# Patient Record
Sex: FEMALE | Race: WHITE | NOT HISPANIC OR LATINO | Employment: STUDENT | URBAN - METROPOLITAN AREA
[De-identification: names, ages, dates, MRNs, and addresses within clinical notes are randomized per-mention and may not be internally consistent; named-entity substitution may affect disease eponyms.]

---
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
## 2023-07-14 ENCOUNTER — TELEPHONE (OUTPATIENT)
Dept: SCHEDULING | Facility: IMAGING CENTER | Age: 18
End: 2023-07-14

## 2023-07-14 SDOH — HEALTH STABILITY: PHYSICAL HEALTH: ON AVERAGE, HOW MANY MINUTES DO YOU ENGAGE IN EXERCISE AT THIS LEVEL?: 60 MIN

## 2023-07-14 SDOH — ECONOMIC STABILITY: HOUSING INSECURITY: IN THE LAST 12 MONTHS, HOW MANY PLACES HAVE YOU LIVED?: 1

## 2023-07-14 SDOH — ECONOMIC STABILITY: FOOD INSECURITY: WITHIN THE PAST 12 MONTHS, YOU WORRIED THAT YOUR FOOD WOULD RUN OUT BEFORE YOU GOT MONEY TO BUY MORE.: NEVER TRUE

## 2023-07-14 SDOH — ECONOMIC STABILITY: INCOME INSECURITY: IN THE LAST 12 MONTHS, WAS THERE A TIME WHEN YOU WERE NOT ABLE TO PAY THE MORTGAGE OR RENT ON TIME?: NO

## 2023-07-14 SDOH — HEALTH STABILITY: PHYSICAL HEALTH: ON AVERAGE, HOW MANY DAYS PER WEEK DO YOU ENGAGE IN MODERATE TO STRENUOUS EXERCISE (LIKE A BRISK WALK)?: 5 DAYS

## 2023-07-14 SDOH — ECONOMIC STABILITY: TRANSPORTATION INSECURITY
IN THE PAST 12 MONTHS, HAS THE LACK OF TRANSPORTATION KEPT YOU FROM MEDICAL APPOINTMENTS OR FROM GETTING MEDICATIONS?: NO

## 2023-07-14 SDOH — ECONOMIC STABILITY: FOOD INSECURITY: WITHIN THE PAST 12 MONTHS, THE FOOD YOU BOUGHT JUST DIDN'T LAST AND YOU DIDN'T HAVE MONEY TO GET MORE.: NEVER TRUE

## 2023-07-14 SDOH — ECONOMIC STABILITY: HOUSING INSECURITY
IN THE LAST 12 MONTHS, WAS THERE A TIME WHEN YOU DID NOT HAVE A STEADY PLACE TO SLEEP OR SLEPT IN A SHELTER (INCLUDING NOW)?: NO

## 2023-07-14 SDOH — ECONOMIC STABILITY: INCOME INSECURITY: HOW HARD IS IT FOR YOU TO PAY FOR THE VERY BASICS LIKE FOOD, HOUSING, MEDICAL CARE, AND HEATING?: NOT VERY HARD

## 2023-07-14 SDOH — ECONOMIC STABILITY: TRANSPORTATION INSECURITY
IN THE PAST 12 MONTHS, HAS LACK OF TRANSPORTATION KEPT YOU FROM MEETINGS, WORK, OR FROM GETTING THINGS NEEDED FOR DAILY LIVING?: NO

## 2023-07-14 SDOH — ECONOMIC STABILITY: TRANSPORTATION INSECURITY
IN THE PAST 12 MONTHS, HAS LACK OF RELIABLE TRANSPORTATION KEPT YOU FROM MEDICAL APPOINTMENTS, MEETINGS, WORK OR FROM GETTING THINGS NEEDED FOR DAILY LIVING?: NO

## 2023-07-14 SDOH — HEALTH STABILITY: MENTAL HEALTH
STRESS IS WHEN SOMEONE FEELS TENSE, NERVOUS, ANXIOUS, OR CAN'T SLEEP AT NIGHT BECAUSE THEIR MIND IS TROUBLED. HOW STRESSED ARE YOU?: ONLY A LITTLE

## 2023-07-14 ASSESSMENT — SOCIAL DETERMINANTS OF HEALTH (SDOH)
ARE YOU MARRIED, WIDOWED, DIVORCED, SEPARATED, NEVER MARRIED, OR LIVING WITH A PARTNER?: NEVER MARRIED
WITHIN THE PAST 12 MONTHS, YOU WORRIED THAT YOUR FOOD WOULD RUN OUT BEFORE YOU GOT THE MONEY TO BUY MORE: NEVER TRUE
HOW MANY DRINKS CONTAINING ALCOHOL DO YOU HAVE ON A TYPICAL DAY WHEN YOU ARE DRINKING: 1 OR 2
HOW OFTEN DO YOU ATTENT MEETINGS OF THE CLUB OR ORGANIZATION YOU BELONG TO?: NEVER
IN A TYPICAL WEEK, HOW MANY TIMES DO YOU TALK ON THE PHONE WITH FAMILY, FRIENDS, OR NEIGHBORS?: TWICE A WEEK
DO YOU BELONG TO ANY CLUBS OR ORGANIZATIONS SUCH AS CHURCH GROUPS UNIONS, FRATERNAL OR ATHLETIC GROUPS, OR SCHOOL GROUPS?: NO
DO YOU BELONG TO ANY CLUBS OR ORGANIZATIONS SUCH AS CHURCH GROUPS UNIONS, FRATERNAL OR ATHLETIC GROUPS, OR SCHOOL GROUPS?: NO
HOW HARD IS IT FOR YOU TO PAY FOR THE VERY BASICS LIKE FOOD, HOUSING, MEDICAL CARE, AND HEATING?: NOT VERY HARD
HOW OFTEN DO YOU GET TOGETHER WITH FRIENDS OR RELATIVES?: TWICE A WEEK
ARE YOU MARRIED, WIDOWED, DIVORCED, SEPARATED, NEVER MARRIED, OR LIVING WITH A PARTNER?: NEVER MARRIED
HOW OFTEN DO YOU HAVE A DRINK CONTAINING ALCOHOL: MONTHLY OR LESS
HOW OFTEN DO YOU ATTEND CHURCH OR RELIGIOUS SERVICES?: MORE THAN 4 TIMES PER YEAR
IN A TYPICAL WEEK, HOW MANY TIMES DO YOU TALK ON THE PHONE WITH FAMILY, FRIENDS, OR NEIGHBORS?: TWICE A WEEK
HOW OFTEN DO YOU ATTEND CHURCH OR RELIGIOUS SERVICES?: MORE THAN 4 TIMES PER YEAR
HOW OFTEN DO YOU HAVE SIX OR MORE DRINKS ON ONE OCCASION: NEVER
HOW OFTEN DO YOU GET TOGETHER WITH FRIENDS OR RELATIVES?: TWICE A WEEK
HOW OFTEN DO YOU ATTENT MEETINGS OF THE CLUB OR ORGANIZATION YOU BELONG TO?: NEVER

## 2023-07-14 ASSESSMENT — LIFESTYLE VARIABLES
HOW OFTEN DO YOU HAVE SIX OR MORE DRINKS ON ONE OCCASION: NEVER
HOW MANY STANDARD DRINKS CONTAINING ALCOHOL DO YOU HAVE ON A TYPICAL DAY: 1 OR 2
AUDIT-C TOTAL SCORE: 1
SKIP TO QUESTIONS 9-10: 1
HOW OFTEN DO YOU HAVE A DRINK CONTAINING ALCOHOL: MONTHLY OR LESS

## 2023-07-19 ENCOUNTER — OFFICE VISIT (OUTPATIENT)
Dept: MEDICAL GROUP | Facility: MEDICAL CENTER | Age: 18
End: 2023-07-19
Payer: OTHER GOVERNMENT

## 2023-07-19 VITALS
OXYGEN SATURATION: 98 % | WEIGHT: 218 LBS | TEMPERATURE: 99 F | HEART RATE: 78 BPM | BODY MASS INDEX: 35.03 KG/M2 | HEIGHT: 66 IN | RESPIRATION RATE: 18 BRPM | SYSTOLIC BLOOD PRESSURE: 130 MMHG | DIASTOLIC BLOOD PRESSURE: 54 MMHG

## 2023-07-19 DIAGNOSIS — Z00.00 PREVENTATIVE HEALTH CARE: ICD-10-CM

## 2023-07-19 DIAGNOSIS — E70.1 PKU (PHENYLKETONURIA) (HCC): ICD-10-CM

## 2023-07-19 DIAGNOSIS — R01.1 HEART MURMUR: ICD-10-CM

## 2023-07-19 DIAGNOSIS — Z11.59 NEED FOR HEPATITIS C SCREENING TEST: ICD-10-CM

## 2023-07-19 DIAGNOSIS — K21.9 GASTROESOPHAGEAL REFLUX DISEASE WITHOUT ESOPHAGITIS: ICD-10-CM

## 2023-07-19 PROCEDURE — 99213 OFFICE O/P EST LOW 20 MIN: CPT | Performed by: STUDENT IN AN ORGANIZED HEALTH CARE EDUCATION/TRAINING PROGRAM

## 2023-07-19 PROCEDURE — 3078F DIAST BP <80 MM HG: CPT | Performed by: STUDENT IN AN ORGANIZED HEALTH CARE EDUCATION/TRAINING PROGRAM

## 2023-07-19 PROCEDURE — 3075F SYST BP GE 130 - 139MM HG: CPT | Performed by: STUDENT IN AN ORGANIZED HEALTH CARE EDUCATION/TRAINING PROGRAM

## 2023-07-19 ASSESSMENT — PATIENT HEALTH QUESTIONNAIRE - PHQ9: CLINICAL INTERPRETATION OF PHQ2 SCORE: 0

## 2023-07-20 PROBLEM — Z30.019 ENCOUNTER FOR FEMALE BIRTH CONTROL: Status: RESOLVED | Noted: 2021-12-06 | Resolved: 2023-07-20

## 2023-07-20 PROBLEM — L60.0 INGROWN NAIL: Status: RESOLVED | Noted: 2021-11-15 | Resolved: 2023-07-20

## 2023-07-20 PROBLEM — S05.02XA ABRASION OF LEFT CORNEA: Status: RESOLVED | Noted: 2021-11-15 | Resolved: 2023-07-20

## 2023-07-20 ASSESSMENT — ENCOUNTER SYMPTOMS
DIZZINESS: 0
FEVER: 0
ABDOMINAL PAIN: 0
DIARRHEA: 0
HEADACHES: 0
SHORTNESS OF BREATH: 0
NAUSEA: 0
BLURRED VISION: 0
CHILLS: 0
VOMITING: 0
PALPITATIONS: 0

## 2023-07-20 NOTE — PROGRESS NOTES
Subjective:     CC:    Chief Complaint   Patient presents with    Establish Care    Other     Referral for gynecology, endocrinology        HISTORY OF THE PRESENT ILLNESS: Patient is a 18 y.o. female. This pleasant patient is here today to establish care and discuss chronic conditions.  She is requesting referrals.  Prior PCP was jeana in [place].    Problem   Gerd (Gastroesophageal Reflux Disease)    Chronic condition.  Symptoms are intermittent, lasting a few days at a time.  Symptoms are generally well controlled.  Patient avoids trigger foods and takes Tums as needed, and this is typically adequate for symptom relief.     Heart Murmur   PKU (Phenylketonuria) (Hcc)    Chronic condition. Patient was previously managed by her pediatrician, but she has not had regular care since before the COVID-19 pandemic. She has not had developmental, neurologic, or other complications. She generally follows the appropriate diet and takes an amino acid supplement daily.  She is hoping to reestablish with a specialist and dietitian.           Past Medical History: Diagnoses of PKU (phenylketonuria) (HCC), Gastroesophageal reflux disease without esophagitis, Heart murmur, Preventative health care, and Need for hepatitis C screening test were pertinent to this visit.    Current Outpatient Medications   Medication Sig    Amino Acids (PHENYLADE AMINO ACID BLEND PO) Take  by mouth.    CALCIUM PO Take  by mouth.        Social History     Socioeconomic History    Marital status: Single    Highest education level: Some college, no degree   Tobacco Use    Smoking status: Never    Smokeless tobacco: Never   Vaping Use    Vaping Use: Never used   Substance and Sexual Activity    Alcohol use: Not Currently     Comment: 1 drink  per month     Drug use: Never    Sexual activity: Yes     Partners: Male     Birth control/protection: Condom     Social Determinants of Health     Financial Resource Strain: Low Risk  (7/14/2023)    Overall  Financial Resource Strain (CARDIA)     Difficulty of Paying Living Expenses: Not very hard   Food Insecurity: No Food Insecurity (7/14/2023)    Hunger Vital Sign     Worried About Running Out of Food in the Last Year: Never true     Ran Out of Food in the Last Year: Never true   Transportation Needs: No Transportation Needs (7/14/2023)    PRAPARE - Transportation     Lack of Transportation (Medical): No     Lack of Transportation (Non-Medical): No   Physical Activity: Sufficiently Active (7/14/2023)    Exercise Vital Sign     Days of Exercise per Week: 5 days     Minutes of Exercise per Session: 60 min   Stress: No Stress Concern Present (7/14/2023)    [employer] [place] [employer] - Occupational Stress Questionnaire     Feeling of Stress : Only a little   Social Connections: Moderately Isolated (7/14/2023)    Social Connection and Isolation Panel [NHANES]     Frequency of Communication with Friends and Family: Twice a week     Frequency of Social Gatherings with Friends and Family: Twice a week     Attends Mormonism Services: More than 4 times per year     Active Member of Clubs or Organizations: No     Attends Club or Organization Meetings: Never     Marital Status: Never    Housing Stability: Low Risk  (7/14/2023)    Housing Stability Vital Sign     Unable to Pay for Housing in the Last Year: No     Number of Places Lived in the Last Year: 1     Unstable Housing in the Last Year: No       Family History   Problem Relation Age of Onset    Psychiatric Illness Mother         Depression/Anxiety    Psychiatric Illness Maternal Grandmother     Diabetes Maternal Grandfather     Breast Cancer Paternal Grandmother          Health Maintenance: Completed      ROS:   Review of Systems   Constitutional:  Negative for chills and fever.   Eyes:  Negative for blurred vision.   Respiratory:  Negative for shortness of breath.    Cardiovascular:  Negative for chest pain and palpitations.   Gastrointestinal:   "Negative for abdominal pain, diarrhea, nausea and vomiting.   Genitourinary:  Negative for dysuria.   Neurological:  Negative for dizziness and headaches.       Objective:       Exam: /54 (BP Location: Right arm, Patient Position: Sitting, BP Cuff Size: Adult long)   Pulse 78   Temp 37.2 °C (99 °F) (Temporal)   Resp 18   Ht 1.664 m (5' 5.5\")   Wt 98.9 kg (218 lb)   SpO2 98%  Body mass index is 35.73 kg/m².    Physical Exam  Constitutional:       General: She is not in acute distress.  HENT:      Right Ear: External ear normal.      Left Ear: External ear normal.      Mouth/Throat:      Mouth: Mucous membranes are moist.      Pharynx: Oropharynx is clear.   Eyes:      Extraocular Movements: Extraocular movements intact.      Pupils: Pupils are equal, round, and reactive to light.   Cardiovascular:      Rate and Rhythm: Normal rate and regular rhythm.      Heart sounds: Murmur heard.      Systolic murmur is present.      No friction rub. No gallop.   Pulmonary:      Effort: Pulmonary effort is normal.      Breath sounds: No wheezing, rhonchi or rales.   Abdominal:      General: There is no distension.      Palpations: Abdomen is soft.      Tenderness: There is no abdominal tenderness.   Musculoskeletal:         General: No swelling.      Cervical back: Normal range of motion and neck supple.   Lymphadenopathy:      Cervical: No cervical adenopathy.   Skin:     General: Skin is warm and dry.   Neurological:      General: No focal deficit present.      Mental Status: She is alert and oriented to person, place, and time.   Psychiatric:         Mood and Affect: Mood normal.           Labs: No recent labs.    Assessment & Plan:     18 y.o. female with the following -    1. PKU (phenylketonuria) (HCC)  Chronic, managed with diet.  Patient is hoping to get reestablished with a PKU specialist and dietitian.  She has not had regular care for the past 2 to 3 years.  Referrals placed. Will check phenylalanine and " tyrosine levels.  - Phenylalanine and Tyrosine; Future  - Referral to Nutrition Services  - Referral to Endocrinology    2. Gastroesophageal reflux disease without esophagitis  Chronic, controlled.  Continue to avoid trigger foods and Tums as needed for symptoms.  If symptoms become more severe or constant, consider daily H2 blocker or PPI.    3. Heart murmur  Systolic murmur noted on exam today.  Patient notes she has been told she may have a murmur by healthcare providers a few times in the past couple of years.  Denies a childhood murmur.  No associated symptoms. Will get an echo to further evaluate.  - EC-ECHOCARDIOGRAM COMPLETE W/O CONT; Future    4. Preventative health care  - CBC WITH DIFFERENTIAL; Future  - Comp Metabolic Panel; Future  - Lipid Profile; Future    5. Need for hepatitis C screening test  - HEP C VIRUS ANTIBODY; Future      Healthcare Maintenance:  - Patient will look for vaccine records so we can update these in the chart.  - We discussed that she will be due for pap next year.        Return in about 8 weeks (around 9/13/2023).    Please note that this dictation was created using voice recognition software. I have made every reasonable attempt to correct obvious errors, but I expect that there are errors of grammar and possibly content that I did not discover before finalizing the note.

## 2023-10-03 ENCOUNTER — NON-PROVIDER VISIT (OUTPATIENT)
Dept: INTERNAL MEDICINE | Facility: OTHER | Age: 18
End: 2023-10-03
Payer: OTHER GOVERNMENT

## 2023-10-03 VITALS — HEIGHT: 66 IN | BODY MASS INDEX: 34.27 KG/M2 | WEIGHT: 213.2 LBS

## 2023-10-03 NOTE — PROGRESS NOTES
"Sendy Alcala is a 18 y.o. year old, female initial nutrition evaluation for PKU  [employer], psych major    ROS: GERD w/esophagitis    Wellness Vision:  I want to get back to what I was like as a kid, improve my grades, get in shape better, know what macros to eat.    My Health Profile:    PHYSICAL ASSESSMENT  Current Height:  66\"  Ht Readings from Last 1 Encounters:   07/19/23 1.664 m (5' 5.5\")     Current Weight:  216#  Wt Readings from Last 1 Encounters:   07/19/23 98.9 kg (218 lb)     BMI: 34    FLUID INTAKE:  water, sweet tea, coffee  Typical Beverages: no milk, no protein beverages  Servings Alcohol/D/WK/MO: none    ACTIVITY REVIEW: gym on campus  Current Exercise: 4/7- weights, cardio  Hobbies: volleyball on w/e    PERTINENT LABS:   pending    Patient Behaviors (indicate frequency)  Meal/Snack Pattern:     Fast food in college, does not restrict self much  Eats meat  Pasta  salads    Dines away from Home: 2 x/week  Locations:  [employer], [place], [employer], [employer]  Who lives in home: apartment, 3 roommates  Additional Patient Behavior Information: cooks/shops-self    PES: Obesity I, PKU r/t recent move to [place], lost touch with PKU specialist, frequent intake of fast food, added sugar sweet tea and consuming meats as evidenced by increased brain fog reported and BMI 34    NUTRITION COMMENTS:    Sendy is a 19 yo dx'd w/ PKU in childhood while living in , managed and worked with Dr. Sanchez in  and [facility] and stopped seeing during COVID year 2020- lost contact    Moved to [place] to attend [employer],eating fast food frequently, hard to find balance but knows she needs to give her PKU attention.    Dr. Sanchez Rx'd Phenylade- takes 4 pkts. Per day  Contact with clinic in [place] who stated that the [place] Team will work with Sendy via telemed and she can re-establish with him to monitor Phe concentrations and possibly consider pegvaliase Rx as an option for Sendy.    PCP ordered Phe and Tyrosine levels, Sendy to have " lab draw this week.    Estimated needs for weight management  2300 calories /day; 3715-9483 calories for weight loss  Await labs for range  Protein needs TBD with Phe levels    RTC x or referral to Dr. Sanchez for follow up- provided contact email and number    Time Spent: 60 minutes

## 2023-10-03 NOTE — PATIENT INSTRUCTIONS
Lawrence Sanchez MD, PhD  [facility] Genetics/Pediatrics  Professor and Chair  [address] [place] [address]  Phone: [phone]  Fax: [phone]  Pager: [phone]  [email or web address]    [place] RDN, CARLOS MANUEL  Inborn Errors of Metabolism

## 2023-10-05 DIAGNOSIS — E70.1 PKU (PHENYLKETONURIA) (HCC): ICD-10-CM

## 2023-12-04 DIAGNOSIS — E70.1 PKU (PHENYLKETONURIA) (HCC): ICD-10-CM

## 2023-12-08 ENCOUNTER — HOSPITAL ENCOUNTER (OUTPATIENT)
Dept: LAB | Facility: MEDICAL CENTER | Age: 18
End: 2023-12-08
Attending: STUDENT IN AN ORGANIZED HEALTH CARE EDUCATION/TRAINING PROGRAM
Payer: OTHER GOVERNMENT

## 2023-12-08 ENCOUNTER — OFFICE VISIT (OUTPATIENT)
Dept: MEDICAL GROUP | Facility: PHYSICIAN GROUP | Age: 18
End: 2023-12-08
Payer: OTHER GOVERNMENT

## 2023-12-08 VITALS
DIASTOLIC BLOOD PRESSURE: 64 MMHG | WEIGHT: 214.8 LBS | TEMPERATURE: 98.1 F | HEIGHT: 66 IN | OXYGEN SATURATION: 96 % | SYSTOLIC BLOOD PRESSURE: 122 MMHG | HEART RATE: 72 BPM | BODY MASS INDEX: 34.52 KG/M2

## 2023-12-08 DIAGNOSIS — N75.0 BARTHOLIN GLAND CYST: ICD-10-CM

## 2023-12-08 DIAGNOSIS — E70.1 PKU (PHENYLKETONURIA) (HCC): ICD-10-CM

## 2023-12-08 DIAGNOSIS — Z11.59 NEED FOR HEPATITIS C SCREENING TEST: ICD-10-CM

## 2023-12-08 DIAGNOSIS — Z00.00 PREVENTATIVE HEALTH CARE: ICD-10-CM

## 2023-12-08 LAB
ALBUMIN SERPL BCP-MCNC: 5 G/DL (ref 3.2–4.9)
ALBUMIN/GLOB SERPL: 1.9 G/DL
ALP SERPL-CCNC: 55 U/L (ref 30–99)
ALT SERPL-CCNC: 19 U/L (ref 2–50)
ANION GAP SERPL CALC-SCNC: 13 MMOL/L (ref 7–16)
AST SERPL-CCNC: 19 U/L (ref 12–45)
BASOPHILS # BLD AUTO: 1.1 % (ref 0–1.8)
BASOPHILS # BLD: 0.09 K/UL (ref 0–0.12)
BILIRUB SERPL-MCNC: 0.8 MG/DL (ref 0.1–1.5)
BUN SERPL-MCNC: 10 MG/DL (ref 8–22)
CALCIUM ALBUM COR SERPL-MCNC: 8.9 MG/DL (ref 8.5–10.5)
CALCIUM SERPL-MCNC: 9.7 MG/DL (ref 8.5–10.5)
CHLORIDE SERPL-SCNC: 105 MMOL/L (ref 96–112)
CHOLEST SERPL-MCNC: 148 MG/DL (ref 100–199)
CO2 SERPL-SCNC: 23 MMOL/L (ref 20–33)
CREAT SERPL-MCNC: 0.73 MG/DL (ref 0.5–1.4)
EOSINOPHIL # BLD AUTO: 0.05 K/UL (ref 0–0.51)
EOSINOPHIL NFR BLD: 0.6 % (ref 0–6.9)
ERYTHROCYTE [DISTWIDTH] IN BLOOD BY AUTOMATED COUNT: 38.5 FL (ref 35.9–50)
FASTING STATUS PATIENT QL REPORTED: NORMAL
GFR SERPLBLD CREATININE-BSD FMLA CKD-EPI: 120 ML/MIN/1.73 M 2
GLOBULIN SER CALC-MCNC: 2.7 G/DL (ref 1.9–3.5)
GLUCOSE SERPL-MCNC: 79 MG/DL (ref 65–99)
HCT VFR BLD AUTO: 41.1 % (ref 37–47)
HCV AB SER QL: NORMAL
HDLC SERPL-MCNC: 30 MG/DL
HGB BLD-MCNC: 13.5 G/DL (ref 12–16)
IMM GRANULOCYTES # BLD AUTO: 0.03 K/UL (ref 0–0.11)
IMM GRANULOCYTES NFR BLD AUTO: 0.4 % (ref 0–0.9)
LDLC SERPL CALC-MCNC: 101 MG/DL
LYMPHOCYTES # BLD AUTO: 2.41 K/UL (ref 1–4.8)
LYMPHOCYTES NFR BLD: 29.3 % (ref 22–41)
MCH RBC QN AUTO: 28.5 PG (ref 27–33)
MCHC RBC AUTO-ENTMCNC: 32.8 G/DL (ref 32.2–35.5)
MCV RBC AUTO: 86.9 FL (ref 81.4–97.8)
MONOCYTES # BLD AUTO: 0.41 K/UL (ref 0–0.85)
MONOCYTES NFR BLD AUTO: 5 % (ref 0–13.4)
NEUTROPHILS # BLD AUTO: 5.23 K/UL (ref 1.82–7.42)
NEUTROPHILS NFR BLD: 63.6 % (ref 44–72)
NRBC # BLD AUTO: 0 K/UL
NRBC BLD-RTO: 0 /100 WBC (ref 0–0.2)
PLATELET # BLD AUTO: 284 K/UL (ref 164–446)
PMV BLD AUTO: 11.6 FL (ref 9–12.9)
POTASSIUM SERPL-SCNC: 4.2 MMOL/L (ref 3.6–5.5)
PROT SERPL-MCNC: 7.7 G/DL (ref 6–8.2)
RBC # BLD AUTO: 4.73 M/UL (ref 4.2–5.4)
SODIUM SERPL-SCNC: 141 MMOL/L (ref 135–145)
TRIGL SERPL-MCNC: 83 MG/DL (ref 0–149)
WBC # BLD AUTO: 8.2 K/UL (ref 4.8–10.8)

## 2023-12-08 PROCEDURE — 36415 COLL VENOUS BLD VENIPUNCTURE: CPT

## 2023-12-08 PROCEDURE — 3078F DIAST BP <80 MM HG: CPT | Performed by: STUDENT IN AN ORGANIZED HEALTH CARE EDUCATION/TRAINING PROGRAM

## 2023-12-08 PROCEDURE — 82131 AMINO ACIDS SINGLE QUANT: CPT

## 2023-12-08 PROCEDURE — 80053 COMPREHEN METABOLIC PANEL: CPT

## 2023-12-08 PROCEDURE — 86803 HEPATITIS C AB TEST: CPT

## 2023-12-08 PROCEDURE — 84510 ASSAY OF TYROSINE: CPT

## 2023-12-08 PROCEDURE — 99213 OFFICE O/P EST LOW 20 MIN: CPT | Performed by: STUDENT IN AN ORGANIZED HEALTH CARE EDUCATION/TRAINING PROGRAM

## 2023-12-08 PROCEDURE — 80061 LIPID PANEL: CPT

## 2023-12-08 PROCEDURE — 3074F SYST BP LT 130 MM HG: CPT | Performed by: STUDENT IN AN ORGANIZED HEALTH CARE EDUCATION/TRAINING PROGRAM

## 2023-12-08 PROCEDURE — 85025 COMPLETE CBC W/AUTO DIFF WBC: CPT

## 2023-12-08 RX ORDER — NUT.TX FOR PKU WITH IRON NO.27 25G-400
POWDER (GRAM) ORAL
COMMUNITY
End: 2023-12-08 | Stop reason: SDUPTHER

## 2023-12-08 RX ORDER — NUT.TX FOR PKU WITH IRON NO.27 25G-400
1 POWDER (GRAM) ORAL 4 TIMES DAILY
Qty: 454 G | Refills: 2 | Status: SHIPPED
Start: 2023-12-08

## 2023-12-08 RX ORDER — SULFAMETHOXAZOLE AND TRIMETHOPRIM 800; 160 MG/1; MG/1
1 TABLET ORAL 2 TIMES DAILY
Qty: 14 TABLET | Refills: 0 | Status: SHIPPED | OUTPATIENT
Start: 2023-12-08 | End: 2023-12-15

## 2023-12-08 NOTE — PROGRESS NOTES
"Subjective:     CC:   Chief Complaint   Patient presents with    Abscess       HPI:   Sendy presents today for evaluation of possible abscess of her vaginal area on the right side.  Noticed redness, swelling, and pain of the right labia minora 2 days ago.  States that it \"burst\" yesterday and has been draining pus and a little bit of blood.  States that the pain and swelling have gone down substantially.  She has not had any fevers or chills.  Denies any abnormal vaginal discharge.  Denies any urinary symptoms      Past medical, family, and social history reviewed by me in Epic chart today.   Medications and allergies reviewed in Epic chart by me today.       Health Maintenance: Completed    ROS:  See HPI    Objective:     Exam:  /64 (BP Location: Left arm, Patient Position: Sitting, BP Cuff Size: Adult)   Pulse 72   Temp 36.7 °C (98.1 °F) (Temporal)   Ht 1.676 m (5' 6\")   Wt 97.4 kg (214 lb 12.8 oz)   LMP 11/30/2023 (Approximate)   SpO2 96%   BMI 34.67 kg/m²  Body mass index is 34.67 kg/m².    Physical Exam  Constitutional:       General: She is not in acute distress.  HENT:      Mouth/Throat:      Mouth: Mucous membranes are moist.   Eyes:      Extraocular Movements: Extraocular movements intact.   Pulmonary:      Effort: Pulmonary effort is normal. No respiratory distress.   Genitourinary:     Comments: Bartholin's gland cyst on the right side with mild swelling. There is an opening with small amount of purulent drainage. Mildly tender to palpation.  Musculoskeletal:      Cervical back: Neck supple.   Skin:     Comments: No visible rashes   Neurological:      Mental Status: She is alert.   Psychiatric:         Mood and Affect: Mood normal.         A chaperone was offered to the patient during today's exam.: Chaperone HEMAL Chase was present.      Assessment & Plan:     18 y.o. female with the following -     1. Bartholin gland cyst  This is an acute problem.  Onset 2 days ago.  The cyst has started " to drain on its own and has dramatically improved.  I&D is not necessary as the cyst is already draining.  Encouraged the patient to apply warm compresses several times per day.  Otherwise keep clean and dry.  I will treat with a 7-day course of Bactrim to prevent recurrence.  Return precautions were reviewed.  - sulfamethoxazole-trimethoprim (BACTRIM DS) 800-160 MG tablet; Take 1 Tablet by mouth 2 times a day for 7 days.  Dispense: 14 Tablet; Refill: 0    2. PKU (phenylketonuria) ([facility])  - Nutritional Supplements (PHENYLADE DRINK MIX) Powder; Take 1 Packet by mouth 4 times a day.  Dispense: 454 g; Refill: 2          Return if symptoms worsen or fail to improve.    Please note that this dictation was created using voice recognition software. I have made every reasonable attempt to correct obvious errors, but I expect that there are errors of grammar and possibly content that I did not discover before finalizing the note.

## 2023-12-13 LAB
PHE SERPL-SCNC: 1146 UMOL/L (ref 30–82)
TYROSINE SERPL-SCNC: 36 UMOL/L (ref 35–110)

## 2024-01-04 ENCOUNTER — ANCILLARY PROCEDURE (OUTPATIENT)
Dept: CARDIOLOGY | Facility: MEDICAL CENTER | Age: 19
End: 2024-01-04
Attending: STUDENT IN AN ORGANIZED HEALTH CARE EDUCATION/TRAINING PROGRAM
Payer: OTHER GOVERNMENT

## 2024-01-04 DIAGNOSIS — R01.1 HEART MURMUR: ICD-10-CM

## 2024-01-04 LAB
LV EJECT FRACT  99904: 68
LV EJECT FRACT MOD 2C 99903: 69.47
LV EJECT FRACT MOD 4C 99902: 64.43
LV EJECT FRACT MOD BP 99901: 68.17

## 2024-01-04 PROCEDURE — 93306 TTE W/DOPPLER COMPLETE: CPT | Mod: 26 | Performed by: INTERNAL MEDICINE

## 2024-01-04 PROCEDURE — 93306 TTE W/DOPPLER COMPLETE: CPT

## 2024-01-05 ENCOUNTER — TELEPHONE (OUTPATIENT)
Dept: MEDICAL GROUP | Facility: PHYSICIAN GROUP | Age: 19
End: 2024-01-05

## 2024-01-05 ENCOUNTER — OFFICE VISIT (OUTPATIENT)
Dept: MEDICAL GROUP | Facility: PHYSICIAN GROUP | Age: 19
End: 2024-01-05
Payer: OTHER GOVERNMENT

## 2024-01-05 VITALS
WEIGHT: 211.6 LBS | OXYGEN SATURATION: 97 % | HEART RATE: 92 BPM | BODY MASS INDEX: 34.01 KG/M2 | DIASTOLIC BLOOD PRESSURE: 64 MMHG | HEIGHT: 66 IN | TEMPERATURE: 97.8 F | SYSTOLIC BLOOD PRESSURE: 118 MMHG

## 2024-01-05 DIAGNOSIS — J06.9 ACUTE URI: ICD-10-CM

## 2024-01-05 DIAGNOSIS — J90 PLEURAL EFFUSION ON LEFT: ICD-10-CM

## 2024-01-05 DIAGNOSIS — M79.672 FOOT PAIN, LEFT: ICD-10-CM

## 2024-01-05 DIAGNOSIS — J10.1 INFLUENZA A: ICD-10-CM

## 2024-01-05 LAB
FLUAV RNA SPEC QL NAA+PROBE: POSITIVE
FLUBV RNA SPEC QL NAA+PROBE: NEGATIVE
RSV RNA SPEC QL NAA+PROBE: NEGATIVE
SARS-COV-2 RNA RESP QL NAA+PROBE: NEGATIVE

## 2024-01-05 PROCEDURE — 3074F SYST BP LT 130 MM HG: CPT | Performed by: STUDENT IN AN ORGANIZED HEALTH CARE EDUCATION/TRAINING PROGRAM

## 2024-01-05 PROCEDURE — 99214 OFFICE O/P EST MOD 30 MIN: CPT | Performed by: STUDENT IN AN ORGANIZED HEALTH CARE EDUCATION/TRAINING PROGRAM

## 2024-01-05 PROCEDURE — 0241U POCT CEPHEID COV-2, FLU A/B, RSV - PCR: CPT | Performed by: STUDENT IN AN ORGANIZED HEALTH CARE EDUCATION/TRAINING PROGRAM

## 2024-01-05 PROCEDURE — 3078F DIAST BP <80 MM HG: CPT | Performed by: STUDENT IN AN ORGANIZED HEALTH CARE EDUCATION/TRAINING PROGRAM

## 2024-01-05 ASSESSMENT — ENCOUNTER SYMPTOMS
DIARRHEA: 0
DIZZINESS: 0
PALPITATIONS: 0
SHORTNESS OF BREATH: 0
FEVER: 1
COUGH: 1
CHILLS: 1
VOMITING: 0
WHEEZING: 0
SENSORY CHANGE: 0
HEADACHES: 0
NAUSEA: 0
TINGLING: 0

## 2024-01-05 ASSESSMENT — FIBROSIS 4 INDEX: FIB4 SCORE: 0.31

## 2024-01-05 ASSESSMENT — PATIENT HEALTH QUESTIONNAIRE - PHQ9: CLINICAL INTERPRETATION OF PHQ2 SCORE: 0

## 2024-01-05 NOTE — PROGRESS NOTES
Subjective:     CC:   Chief Complaint   Patient presents with    Toe Pain    Foot Pain     Left foot was at kickboxing and might have injured foot Past week tip of toe to mid foot. Can't walk down stairs. No swelling no numbness        HPI:   Sendy presents today with    Problem   Acute URI    This is an acute problem.  Symptoms onset 3 days ago.  Patient reports headache, nasal congestion, sore throat, and cough.  She had a fever yesterday, which has resolved.  She denies any chest pain or shortness of breath.  Notes that several people with whom she spent the Christmas holiday have also come down with similar symptoms.     Foot Pain, Left    This is a new problem.  Symptoms onset about 1 month ago.  Patient notes that she may have injured her foot several months ago during kickboxing, but her current symptoms did not develop for quite some time after that injury.  She describes a sharp pain affecting the great toe and the forefoot at the base of the first and second toes.  The pain is constant but waxes and wanes, sometimes so severe that she cannot bear weight on that foot.  Worsens with any prolonged periods of weightbearing.  She denies any redness or swelling associated with this pain.  She has never had similar symptoms in the past.     Pleural Effusion On Left    Left pleural effusion noted on recent echocardiogram 1/4/2024.  Patient has been sick for the past few days with URI symptoms.  She has had a cough.  She denies any chest pain or shortness of breath.       Heart Murmur    Benign heart murmur.    Echo 1/4/2024:  CONCLUSIONS  Normal transthoracic echocardiogram.   Left pleural effusion present.           Past medical, family, and social history reviewed by me in Epic chart today.   Medications and allergies reviewed in Epic chart by me today.       Health Maintenance: Completed    ROS:  Review of Systems   Constitutional:  Positive for chills, fever and malaise/fatigue.   HENT:  Positive for  "congestion.    Respiratory:  Positive for cough. Negative for shortness of breath and wheezing.    Cardiovascular:  Negative for chest pain and palpitations.   Gastrointestinal:  Negative for diarrhea, nausea and vomiting.   Musculoskeletal:  Positive for joint pain.   Skin:  Negative for itching and rash.   Neurological:  Negative for dizziness, tingling, sensory change and headaches.       Objective:     Exam:  /64 (BP Location: Left arm, Patient Position: Sitting, BP Cuff Size: Adult)   Pulse 92   Temp 36.6 °C (97.8 °F) (Temporal)   Ht 1.676 m (5' 6\")   Wt 96 kg (211 lb 9.6 oz)   LMP 11/30/2023 (Approximate)   SpO2 97%   BMI 34.15 kg/m²  Body mass index is 34.15 kg/m².    Physical Exam  Constitutional:       General: She is not in acute distress.  HENT:      Right Ear: External ear normal.      Left Ear: External ear normal.      Mouth/Throat:      Mouth: Mucous membranes are moist.      Pharynx: Oropharynx is clear. Posterior oropharyngeal erythema present. No oropharyngeal exudate.   Eyes:      General:         Right eye: No discharge.         Left eye: Discharge present.     Extraocular Movements: Extraocular movements intact.      Conjunctiva/sclera: Conjunctivae normal.      Pupils: Pupils are equal, round, and reactive to light.   Cardiovascular:      Rate and Rhythm: Normal rate and regular rhythm.      Heart sounds: No murmur heard.     No friction rub. No gallop.   Pulmonary:      Effort: Pulmonary effort is normal.      Breath sounds: No wheezing, rhonchi or rales.   Musculoskeletal:      Cervical back: Normal range of motion and neck supple.   Feet:      Comments: Left foot with normal range of motion of the ankles and toes.  No swelling.  No bruising or redness.  No bony tenderness.  There is point tenderness to palpation of the ball of the foot between the first and second metatarsals.  Neurovascularly intact.  Lymphadenopathy:      Cervical: No cervical adenopathy.   Skin:     General: " Skin is warm and dry.   Neurological:      Mental Status: She is alert.   Psychiatric:         Mood and Affect: Mood normal.         Labs:     Results for orders placed or performed in visit on 01/05/24   POCT CEPHEID COV-2, FLU A/B, RSV - PCR   Result Value Ref Range    SARS-CoV-2 by PCR Negative Negative, Invalid    Influenza virus A RNA Positive (A) Negative, Invalid    Influenza virus B, PCR Negative Negative, Invalid    RSV, PCR Negative Negative, Invalid         Assessment & Plan:     18 y.o. female with the following -     1. Foot pain, left  Acute problem, intermittent for the past month.  Normal appearance of the foot and toes on exam.  She does have some tenderness to plantar aspect of the forefoot, particularly between the first and second metatarsals.  Differential diagnosis includes metatarsalgia, Sanderson's neuroma, less likely gout.  I have recommended conservative treatment at this time.  NSAIDs can be taken as needed for pain and inflammation.  Encouraged patient to start using metatarsal pads when she is going to be walking.  She can also try intermittent icing.  I will order a nonvascular ultrasound of the foot to evaluate for possible neuroma.  - US-EXTREMITY NON VASCULAR UNILATERAL LEFT; Future    2. Acute URI  3. Influenza A  This is an acute problem.  Patient has had symptoms for 3 days.  She was tested positive for influenza A in the clinic.  Negative COVID-19 and RSV.  It has been > than 48 hours since symptom onset, so Tamiflu is not indicated.  Physical exam is benign and patient has normal vital signs.  Reviewed supportive treatment options, including over the counter medications which may be helpful for symptom relief. Patient was instructed to contact the clinic or return if symptoms worsen or fail to improve after 7-10 days of symptoms, although I have explained that it is not uncommon for some symptoms to linger for several weeks.   - POCT CEPHEID COV-2, FLU A/B, RSV - PCR    4. Pleural  effusion on left  Incidental finding on recent echocardiogram.  Patient does not have significant lower respiratory symptoms.  I have ordered a chest x-ray to further evaluate.  - DX-CHEST-2 VIEWS; Future      I spent a total of 31 minutes with record review, exam, communication with the patient, communication with other providers, and documentation of this encounter.        Return in about 1 month (around 2/5/2024) for follow up.    Please note that this dictation was created using voice recognition software. I have made every reasonable attempt to correct obvious errors, but I expect that there are errors of grammar and possibly content that I did not discover before finalizing the note.

## 2024-01-05 NOTE — TELEPHONE ENCOUNTER
Caller Name: Dr. Lawrence Sanchez  Call Back Number: [phone]    How would the patient prefer to be contacted with a response: Phone call OK to leave a detailed message    Reached out to [facility] to confirm referral was received. Referral is still under review.  Need new referral     Fax

## 2024-01-06 ENCOUNTER — HOSPITAL ENCOUNTER (OUTPATIENT)
Dept: RADIOLOGY | Facility: MEDICAL CENTER | Age: 19
End: 2024-01-06
Attending: STUDENT IN AN ORGANIZED HEALTH CARE EDUCATION/TRAINING PROGRAM
Payer: OTHER GOVERNMENT

## 2024-01-06 DIAGNOSIS — J90 PLEURAL EFFUSION ON LEFT: ICD-10-CM

## 2024-01-06 PROCEDURE — 71046 X-RAY EXAM CHEST 2 VIEWS: CPT

## 2024-01-09 ENCOUNTER — APPOINTMENT (OUTPATIENT)
Dept: INTERNAL MEDICINE | Facility: OTHER | Age: 19
End: 2024-01-09
Payer: OTHER GOVERNMENT

## 2024-01-09 ENCOUNTER — NON-PROVIDER VISIT (OUTPATIENT)
Dept: INTERNAL MEDICINE | Facility: OTHER | Age: 19
End: 2024-01-09
Payer: OTHER GOVERNMENT

## 2024-01-09 DIAGNOSIS — Z71.3 DIETARY COUNSELING AND SURVEILLANCE: ICD-10-CM

## 2024-01-09 DIAGNOSIS — E70.1 PKU (PHENYLKETONURIA) (HCC): ICD-10-CM

## 2024-01-09 PROCEDURE — 97803 MED NUTRITION INDIV SUBSEQ: CPT | Performed by: DIETITIAN, REGISTERED

## 2024-01-09 NOTE — PROGRESS NOTES
Sendy Alcala is a 18 y.o. year old, female returns for adult PKU management.    ROS: left foot injury limiting physical activity    Positive changes since last visit:    Fast food intake has decreased  Maintaining ~1800 calories per day, weight has shifted down 6 lbs  Confirmation to see Dr. Sanchez and re-establish care 1/8/24    Meal/Eating/Environment Pattern:    B: strawberries, bananas, toast- occ whole wheat, butter and grape jelly  L: chicken salad sandwich w/whole wheat- Tram Seth  Snacks throughout the day: seaweed, yogi's,   Coffee- K-cups no sugar added  D: Salad + Lean Cuisine, water    Phenylade packets QID- need to order more= 40 grams amino acid protein/day     Latest Reference Range & Units 12/08/23 13:35   Tyrosine 35 - 110 umol/L 36      Latest Reference Range & Units 12/08/23 13:35   Phenylalinine 30 - 82 umol/L 1146 (H)   (H): Data is abnormally high   Latest Reference Range & Units 12/08/23 13:35   Cholesterol,Tot 100 - 199 mg/dL 148   Triglycerides 0 - 149 mg/dL 83   HDL >=40 mg/dL 30 !   LDL <100 mg/dL 101 (H)   !: Data is abnormal  (H): Data is abnormally high      Comments:    Sendy has been waiting for approval to see Dr. Sanchez since initial visit with me on 10/3/23. Has been diligent to include PhenylAde in her daily intake, will be running out soon and needs a new Rx.    Phe levels and tyrosine labs noted.   Recommend to continue with [id number] calorie intake for nutrition needs with weight management, Sendy would like to order low-Phe foods to bring in more variety to her daily choices. Plans to write down what she eats, calculate Phe levels and stay with compliant foods that are low-Phe.    Protein needs with current Phe levels: 70-75 grams per day (whole protein) as an estimate; however, mainly to meet RDI and will need adjustment per Dr. Sanchez consultation. May need more if most protein in form of amino acids or if Pegvaliase an option.    RTC x after visit with   Daniel    Time Spent:  60 minutes

## 2024-01-10 NOTE — PATIENT INSTRUCTIONS
I will start tracking/logging my intake  - Phe levels and continued calories, protein intake  - collect information/data for metabolic consult with Dr. Sanchez    I will continue drinking my Phenylade four times per day  - provides 10 grams of low-Phe per pkt= 40 grams/d  - add in more low-Phe foods and veggies/fruits  - purchase low-Phe on website      I will include more fiber and physical activity for HDL management  - low Phe veggies/fruits: chayote squash, figs    Await Dr. Sanchez consult  - obtain low Phe foods from website:     I will keep my gym membership  - every day I eat, I move  - find my 30 minutes  - aim for upper body activity until u/s left foot

## 2024-01-11 VITALS — WEIGHT: 211 LBS | BODY MASS INDEX: 34.06 KG/M2

## 2024-01-11 ASSESSMENT — FIBROSIS 4 INDEX: FIB4 SCORE: 0.31

## 2024-01-23 ENCOUNTER — HOSPITAL ENCOUNTER (OUTPATIENT)
Dept: RADIOLOGY | Facility: MEDICAL CENTER | Age: 19
End: 2024-01-23
Attending: STUDENT IN AN ORGANIZED HEALTH CARE EDUCATION/TRAINING PROGRAM
Payer: OTHER GOVERNMENT

## 2024-01-23 DIAGNOSIS — M79.672 FOOT PAIN, LEFT: ICD-10-CM

## 2024-01-23 PROCEDURE — 76882 US LMTD JT/FCL EVL NVASC XTR: CPT | Mod: LT

## 2024-05-07 ENCOUNTER — APPOINTMENT (OUTPATIENT)
Dept: MEDICAL GROUP | Facility: PHYSICIAN GROUP | Age: 19
End: 2024-05-07
Payer: OTHER GOVERNMENT

## 2024-06-11 ENCOUNTER — TELEPHONE (OUTPATIENT)
Dept: MEDICAL GROUP | Facility: PHYSICIAN GROUP | Age: 19
End: 2024-06-11
Payer: OTHER GOVERNMENT

## 2024-06-11 NOTE — TELEPHONE ENCOUNTER
VOICEMAIL  1. Caller Name: Metabolic office                           Call Back Number:     2. Message: Needs to be referred out to be medical sued for PKU ?     3. Patient approves office to leave a detailed voicemail/MyChart message: N\A

## 2024-09-13 ENCOUNTER — APPOINTMENT (OUTPATIENT)
Dept: MEDICAL GROUP | Facility: PHYSICIAN GROUP | Age: 19
End: 2024-09-13
Payer: OTHER GOVERNMENT

## 2024-09-13 VITALS
WEIGHT: 206 LBS | HEIGHT: 66 IN | HEART RATE: 65 BPM | OXYGEN SATURATION: 96 % | DIASTOLIC BLOOD PRESSURE: 70 MMHG | TEMPERATURE: 98.5 F | BODY MASS INDEX: 33.11 KG/M2 | SYSTOLIC BLOOD PRESSURE: 110 MMHG

## 2024-09-13 DIAGNOSIS — G89.29 CHRONIC FOOT PAIN, LEFT: ICD-10-CM

## 2024-09-13 DIAGNOSIS — M79.672 CHRONIC FOOT PAIN, LEFT: ICD-10-CM

## 2024-09-13 DIAGNOSIS — M25.562 ACUTE PAIN OF LEFT KNEE: ICD-10-CM

## 2024-09-13 PROCEDURE — 3074F SYST BP LT 130 MM HG: CPT | Performed by: STUDENT IN AN ORGANIZED HEALTH CARE EDUCATION/TRAINING PROGRAM

## 2024-09-13 PROCEDURE — 99213 OFFICE O/P EST LOW 20 MIN: CPT | Performed by: STUDENT IN AN ORGANIZED HEALTH CARE EDUCATION/TRAINING PROGRAM

## 2024-09-13 PROCEDURE — 3078F DIAST BP <80 MM HG: CPT | Performed by: STUDENT IN AN ORGANIZED HEALTH CARE EDUCATION/TRAINING PROGRAM

## 2024-09-13 ASSESSMENT — FIBROSIS 4 INDEX: FIB4 SCORE: 0.32

## 2024-09-13 NOTE — LETTER
September 13, 2024         Patient: Sendy Alcala   YOB: 2005   Date of Visit: 9/13/2024           To Whom it May Concern:    Sendy Alcala was seen in my clinic on 9/13/2024. Should avoid any activity that exacerbates knee pain until she has been evaluated by orthopedics.    If you have any questions or concerns, please don't hesitate to call.        Sincerely,           Winnie Jane P.A.-C.  Electronically Signed

## 2024-09-13 NOTE — PROGRESS NOTES
Verbal consent was acquired by the patient to use [employer] ambient listening note generation during this visit.    Subjective:     HPI:   History of Present Illness  The patient presents for evaluation of multiple medical concerns.    She continues to experience discomfort in her left foot, with the pain now localized more towards the ankle. The initial sharp pain around the big toe has subsided. Activities such as walking, cardio exercises, jumping, and descending stairs tend to exacerbate the pain. She also reports a new onset of similar pain in her other foot.     A few months ago, she began to notice an occasional popping or cracking sound in her left knee. This has progressively worsened, although it was not initially painful. The onset of pain coincided with the start of a workout class involving basic movements, cardio, and bodyweight exercises. The pain was so severe on Tuesday that she had to pause her workout. By Wednesday, she was unable to attend classes due to the pain, which was exacerbated by any weightbearing. The pain tends to fluctuate throughout the day, often worsening with prolonged walking and resulting in soreness by the end of the day. She reports no swelling or previous knee injuries.  She also reports a feeling of instability in the knee, particularly when squatting with weight or deadlifting.             Past medical, surgical, family, and social history were reviewed and updated.     Medications:    Current Outpatient Medications:     Nutritional Supplements (PHENYLADE DRINK MIX) Powder, Take 1 Packet by mouth 4 times a day., Disp: 454 g, Rfl: 2    Amino Acids (PHENYLADE AMINO ACID BLEND PO), Take  by mouth., Disp: , Rfl:     CALCIUM PO, Take  by mouth., Disp: , Rfl:     Allergies:  Patient has no known allergies.      Health Maintenance: Completed    ROS:  See HPI    Objective:     Exam:  /70 (BP Location: Left arm, Patient Position: Sitting, BP Cuff Size: Adult)   Pulse 65    "Temp 36.9 °C (98.5 °F) (Temporal)   Ht 1.676 m (5' 6\")   Wt 93.4 kg (206 lb)   SpO2 96%   BMI 33.25 kg/m²  Body mass index is 33.25 kg/m².    Physical Exam  Constitutional:       General: She is not in acute distress.  HENT:      Mouth/Throat:      Mouth: Mucous membranes are moist.   Eyes:      Extraocular Movements: Extraocular movements intact.   Pulmonary:      Effort: Pulmonary effort is normal. No respiratory distress.   Musculoskeletal:      Cervical back: Neck supple.      Comments: Left Knee: Full ROM, strength 5/5 bilaterally, TTP negative , no edema, varus/valgus stress negative, subjectively has discomfort particularly wit varus stress, anterior/posterior drawer negative. Gait is normal.   Left foot: No noticeable deformity, swelling, or bruising. ROM intact.  No tenderness to palpation along posterior edge of lateral and medial malleoli, base of 5th metatarsal or navicular bone. No tenderness along fibula.  5/5 strength bilaterally.       Skin:     Comments: No visible rashes   Neurological:      Mental Status: She is alert.   Psychiatric:         Mood and Affect: Mood normal.         Results      Assessment & Plan:     1. Acute pain of left knee  DX-KNEE COMPLETE 4+ LEFT    Referral to Orthopedics    DX-FOOT-COMPLETE 3+ LEFT      2. Chronic foot pain, left  DX-FOOT-COMPLETE 3+ LEFT          Assessment & Plan  1. Left knee pain.  Acute problem. The discomfort in the left knee started a couple of months ago and has progressively worsened. The knee exhibits cracking sounds and occasional severe pain, particularly after prolonged walking or physical activity. An x-ray of the left knee will be ordered to investigate further. A referral to orthopedics will be made for follow up. Recommend conservative management at this time with NSAIDs as needed, intermittent ice, and rest from strenuous activity. Compression for swelling as needed.    2. Left foot pain.  Patient has had persistent discomfort in the " left foot. An x-ray of the left foot will be ordered.       Health maintenance.  A Pap smear will be scheduled in the coming months. She is advised to locate her childhood immunization records and provide them at her next visit.             Please note that this dictation was created using voice recognition software. I have made every reasonable attempt to correct obvious errors, but I expect that there are errors of grammar and possibly content that I did not discover before finalizing the note.

## 2024-09-13 NOTE — LETTER
September 13, 2024         Patient: Sendy Alcala   YOB: 2005   Date of Visit: 9/13/2024           To Whom it May Concern:    Sendy Alcala was seen in my clinic on 9/13/2024. Please excuse her from classes 9/12/2024.    If you have any questions or concerns, please don't hesitate to call.        Sincerely,           Winnie Jane P.A.-C.  Electronically Signed

## 2024-12-13 ENCOUNTER — APPOINTMENT (OUTPATIENT)
Dept: MEDICAL GROUP | Facility: PHYSICIAN GROUP | Age: 19
End: 2024-12-13
Payer: OTHER GOVERNMENT

## 2025-02-12 ENCOUNTER — APPOINTMENT (OUTPATIENT)
Dept: MEDICAL GROUP | Facility: PHYSICIAN GROUP | Age: 20
End: 2025-02-12
Payer: OTHER GOVERNMENT

## 2025-05-01 ENCOUNTER — APPOINTMENT (OUTPATIENT)
Dept: MEDICAL GROUP | Facility: PHYSICIAN GROUP | Age: 20
End: 2025-05-01
Payer: OTHER GOVERNMENT

## 2025-05-01 VITALS
HEIGHT: 66 IN | HEART RATE: 80 BPM | OXYGEN SATURATION: 99 % | RESPIRATION RATE: 20 BRPM | SYSTOLIC BLOOD PRESSURE: 132 MMHG | BODY MASS INDEX: 34.1 KG/M2 | TEMPERATURE: 99 F | DIASTOLIC BLOOD PRESSURE: 58 MMHG | WEIGHT: 212.2 LBS

## 2025-05-01 DIAGNOSIS — H61.23 IMPACTED CERUMEN, BILATERAL: ICD-10-CM

## 2025-05-01 PROCEDURE — 1126F AMNT PAIN NOTED NONE PRSNT: CPT | Performed by: STUDENT IN AN ORGANIZED HEALTH CARE EDUCATION/TRAINING PROGRAM

## 2025-05-01 PROCEDURE — 3075F SYST BP GE 130 - 139MM HG: CPT | Performed by: STUDENT IN AN ORGANIZED HEALTH CARE EDUCATION/TRAINING PROGRAM

## 2025-05-01 PROCEDURE — 99212 OFFICE O/P EST SF 10 MIN: CPT | Performed by: STUDENT IN AN ORGANIZED HEALTH CARE EDUCATION/TRAINING PROGRAM

## 2025-05-01 PROCEDURE — 3078F DIAST BP <80 MM HG: CPT | Performed by: STUDENT IN AN ORGANIZED HEALTH CARE EDUCATION/TRAINING PROGRAM

## 2025-05-01 RX ORDER — PEGVALIASE-PQPZ 20 MG/ML
INJECTION, SOLUTION SUBCUTANEOUS
COMMUNITY
Start: 2025-04-16

## 2025-05-01 RX ORDER — FEXOFENADINE HCL 60 MG/1
TABLET, FILM COATED ORAL
COMMUNITY
Start: 2025-02-12

## 2025-05-01 RX ORDER — NUTRITIONAL TX FOR PKU NO.31 10G-42/13G
POWDER (GRAM) ORAL
COMMUNITY
End: 2025-05-01

## 2025-05-01 ASSESSMENT — ENCOUNTER SYMPTOMS
SHORTNESS OF BREATH: 0
FEVER: 0
DIZZINESS: 0
HEADACHES: 0
CHILLS: 0

## 2025-05-01 ASSESSMENT — FIBROSIS 4 INDEX: FIB4 SCORE: 0.34

## 2025-05-01 ASSESSMENT — PAIN SCALES - GENERAL: PAINLEVEL_OUTOF10: NO PAIN

## 2025-05-01 NOTE — PROGRESS NOTES
"Subjective:     CC:   Chief Complaint   Patient presents with    Ear Fullness     2-3 months, both ears, however, right ear is more cifuentes (3-4 days), no pain per patient.       HPI:   Sendy presents today to have her ears checked.  Onset: 2-3 months  Ear fullness, worse on right  Muffled hearing on right  No ear pain or drainage    Past medical, surgical, family, and social history were reviewed and updated.     Medications:    Current Outpatient Medications:     PALYNZIQ 20 MG/ML Solution Prefilled Syringe, , Disp: , Rfl:     fexofenadine (ALLEGRA ALLERGY) 60 MG Tab, , Disp: , Rfl:     Nutritional Supplements (PHENYLADE DRINK MIX) Powder, Take 1 Packet by mouth 4 times a day. (Patient taking differently: Take 1 Packet by mouth 4 times a day. 5 Times Daily, per patient), Disp: 454 g, Rfl: 2    CALCIUM PO, Take  by mouth., Disp: , Rfl:     Allergies:  Patient has no known allergies.      Health Maintenance: Completed    ROS:  Review of Systems   Constitutional:  Negative for chills and fever.   Respiratory:  Negative for shortness of breath.    Cardiovascular:  Negative for chest pain.   Neurological:  Negative for dizziness and headaches.       Objective:     Exam:  /58 (BP Location: Left arm, Patient Position: Sitting, BP Cuff Size: Large adult)   Pulse 80   Temp 37.2 °C (99 °F) (Temporal)   Resp 20   Ht 1.669 m (5' 5.71\")   Wt 96.3 kg (212 lb 3.2 oz)   SpO2 99%   BMI 34.55 kg/m²  Body mass index is 34.55 kg/m².    Physical Exam  Constitutional:       General: She is not in acute distress.  HENT:      Right Ear: There is impacted cerumen.      Left Ear: There is impacted cerumen.      Mouth/Throat:      Mouth: Mucous membranes are moist.   Eyes:      Extraocular Movements: Extraocular movements intact.   Pulmonary:      Effort: Pulmonary effort is normal. No respiratory distress.   Musculoskeletal:      Cervical back: Neck supple.   Skin:     Comments: No visible rashes   Neurological:      Mental " Status: She is alert.   Psychiatric:         Mood and Affect: Mood normal.           Assessment & Plan:     20 y.o. female with the following -     1. Impacted cerumen, bilateral  Acute, uncomplicated problem.  Ear lavage by medical assistant.  On recheck both canals are clear.  Educated patient to avoid using Q-tips.  Ear plugs and in-ear headphones can make result in cerumen impaction.  OTC ear wax softening drops can be used to help prevent/treat cerumen impaction.        Return if symptoms worsen or fail to improve.      Please note that this dictation was created using voice recognition software. I have made every reasonable attempt to correct obvious errors, but I expect that there are errors of grammar and possibly content that I did not discover before finalizing the note.

## 2025-06-25 ENCOUNTER — APPOINTMENT (OUTPATIENT)
Dept: MEDICAL GROUP | Facility: PHYSICIAN GROUP | Age: 20
End: 2025-06-25
Payer: OTHER GOVERNMENT

## 2025-06-26 ENCOUNTER — OFFICE VISIT (OUTPATIENT)
Dept: MEDICAL GROUP | Facility: PHYSICIAN GROUP | Age: 20
End: 2025-06-26
Payer: OTHER GOVERNMENT

## 2025-06-26 ENCOUNTER — HOSPITAL ENCOUNTER (OUTPATIENT)
Facility: MEDICAL CENTER | Age: 20
End: 2025-06-26
Attending: STUDENT IN AN ORGANIZED HEALTH CARE EDUCATION/TRAINING PROGRAM
Payer: OTHER GOVERNMENT

## 2025-06-26 VITALS
SYSTOLIC BLOOD PRESSURE: 118 MMHG | HEIGHT: 65 IN | OXYGEN SATURATION: 100 % | BODY MASS INDEX: 33.32 KG/M2 | HEART RATE: 71 BPM | TEMPERATURE: 97.8 F | WEIGHT: 200 LBS | DIASTOLIC BLOOD PRESSURE: 70 MMHG

## 2025-06-26 DIAGNOSIS — Z12.4 SCREENING FOR CERVICAL CANCER: ICD-10-CM

## 2025-06-26 DIAGNOSIS — Z01.419 ENCOUNTER FOR WELL WOMAN EXAM: Primary | ICD-10-CM

## 2025-06-26 DIAGNOSIS — Z11.3 SCREENING EXAMINATION FOR STI: ICD-10-CM

## 2025-06-26 DIAGNOSIS — L73.2 HIDRADENITIS SUPPURATIVA: ICD-10-CM

## 2025-06-26 PROCEDURE — 99000 SPECIMEN HANDLING OFFICE-LAB: CPT | Performed by: STUDENT IN AN ORGANIZED HEALTH CARE EDUCATION/TRAINING PROGRAM

## 2025-06-26 PROCEDURE — 3078F DIAST BP <80 MM HG: CPT | Performed by: STUDENT IN AN ORGANIZED HEALTH CARE EDUCATION/TRAINING PROGRAM

## 2025-06-26 PROCEDURE — 88142 CYTOPATH C/V THIN LAYER: CPT

## 2025-06-26 PROCEDURE — 87491 CHLMYD TRACH DNA AMP PROBE: CPT

## 2025-06-26 PROCEDURE — 3074F SYST BP LT 130 MM HG: CPT | Performed by: STUDENT IN AN ORGANIZED HEALTH CARE EDUCATION/TRAINING PROGRAM

## 2025-06-26 PROCEDURE — 87591 N.GONORRHOEAE DNA AMP PROB: CPT

## 2025-06-26 PROCEDURE — 99395 PREV VISIT EST AGE 18-39: CPT | Performed by: STUDENT IN AN ORGANIZED HEALTH CARE EDUCATION/TRAINING PROGRAM

## 2025-06-26 SDOH — ECONOMIC STABILITY: INCOME INSECURITY: IN THE LAST 12 MONTHS, WAS THERE A TIME WHEN YOU WERE NOT ABLE TO PAY THE MORTGAGE OR RENT ON TIME?: NO

## 2025-06-26 SDOH — HEALTH STABILITY: MENTAL HEALTH
STRESS IS WHEN SOMEONE FEELS TENSE, NERVOUS, ANXIOUS, OR CAN'T SLEEP AT NIGHT BECAUSE THEIR MIND IS TROUBLED. HOW STRESSED ARE YOU?: NOT AT ALL

## 2025-06-26 SDOH — ECONOMIC STABILITY: FOOD INSECURITY: WITHIN THE PAST 12 MONTHS, THE FOOD YOU BOUGHT JUST DIDN'T LAST AND YOU DIDN'T HAVE MONEY TO GET MORE.: NEVER TRUE

## 2025-06-26 SDOH — ECONOMIC STABILITY: INCOME INSECURITY: HOW HARD IS IT FOR YOU TO PAY FOR THE VERY BASICS LIKE FOOD, HOUSING, MEDICAL CARE, AND HEATING?: NOT VERY HARD

## 2025-06-26 SDOH — HEALTH STABILITY: PHYSICAL HEALTH: ON AVERAGE, HOW MANY MINUTES DO YOU ENGAGE IN EXERCISE AT THIS LEVEL?: 40 MIN

## 2025-06-26 SDOH — HEALTH STABILITY: PHYSICAL HEALTH: ON AVERAGE, HOW MANY DAYS PER WEEK DO YOU ENGAGE IN MODERATE TO STRENUOUS EXERCISE (LIKE A BRISK WALK)?: 4 DAYS

## 2025-06-26 SDOH — ECONOMIC STABILITY: FOOD INSECURITY: WITHIN THE PAST 12 MONTHS, YOU WORRIED THAT YOUR FOOD WOULD RUN OUT BEFORE YOU GOT MONEY TO BUY MORE.: NEVER TRUE

## 2025-06-26 ASSESSMENT — SOCIAL DETERMINANTS OF HEALTH (SDOH)
HOW OFTEN DO YOU ATTENT MEETINGS OF THE CLUB OR ORGANIZATION YOU BELONG TO?: MORE THAN 4 TIMES PER YEAR
IN A TYPICAL WEEK, HOW MANY TIMES DO YOU TALK ON THE PHONE WITH FAMILY, FRIENDS, OR NEIGHBORS?: MORE THAN THREE TIMES A WEEK
WITHIN THE PAST 12 MONTHS, YOU WORRIED THAT YOUR FOOD WOULD RUN OUT BEFORE YOU GOT THE MONEY TO BUY MORE: NEVER TRUE
ARE YOU MARRIED, WIDOWED, DIVORCED, SEPARATED, NEVER MARRIED, OR LIVING WITH A PARTNER?: NEVER MARRIED
HOW OFTEN DO YOU GET TOGETHER WITH FRIENDS OR RELATIVES?: ONCE A WEEK
IN A TYPICAL WEEK, HOW MANY TIMES DO YOU TALK ON THE PHONE WITH FAMILY, FRIENDS, OR NEIGHBORS?: MORE THAN THREE TIMES A WEEK
HOW OFTEN DO YOU GET TOGETHER WITH FRIENDS OR RELATIVES?: ONCE A WEEK
HOW OFTEN DO YOU ATTENT MEETINGS OF THE CLUB OR ORGANIZATION YOU BELONG TO?: MORE THAN 4 TIMES PER YEAR
HOW OFTEN DO YOU HAVE A DRINK CONTAINING ALCOHOL: 2-4 TIMES A MONTH
IN THE PAST 12 MONTHS, HAS THE ELECTRIC, GAS, OIL, OR WATER COMPANY THREATENED TO SHUT OFF SERVICE IN YOUR HOME?: NO
ARE YOU MARRIED, WIDOWED, DIVORCED, SEPARATED, NEVER MARRIED, OR LIVING WITH A PARTNER?: NEVER MARRIED
DO YOU BELONG TO ANY CLUBS OR ORGANIZATIONS SUCH AS CHURCH GROUPS UNIONS, FRATERNAL OR ATHLETIC GROUPS, OR SCHOOL GROUPS?: YES
HOW OFTEN DO YOU ATTEND CHURCH OR RELIGIOUS SERVICES?: MORE THAN 4 TIMES PER YEAR
HOW OFTEN DO YOU ATTEND CHURCH OR RELIGIOUS SERVICES?: MORE THAN 4 TIMES PER YEAR
HOW MANY DRINKS CONTAINING ALCOHOL DO YOU HAVE ON A TYPICAL DAY WHEN YOU ARE DRINKING: 1 OR 2
DO YOU BELONG TO ANY CLUBS OR ORGANIZATIONS SUCH AS CHURCH GROUPS UNIONS, FRATERNAL OR ATHLETIC GROUPS, OR SCHOOL GROUPS?: YES
HOW OFTEN DO YOU HAVE SIX OR MORE DRINKS ON ONE OCCASION: MONTHLY
HOW HARD IS IT FOR YOU TO PAY FOR THE VERY BASICS LIKE FOOD, HOUSING, MEDICAL CARE, AND HEATING?: NOT VERY HARD

## 2025-06-26 ASSESSMENT — FIBROSIS 4 INDEX: FIB4 SCORE: 0.34

## 2025-06-26 ASSESSMENT — LIFESTYLE VARIABLES
SKIP TO QUESTIONS 9-10: 0
HOW OFTEN DO YOU HAVE SIX OR MORE DRINKS ON ONE OCCASION: MONTHLY
HOW OFTEN DO YOU HAVE A DRINK CONTAINING ALCOHOL: 2-4 TIMES A MONTH
HOW MANY STANDARD DRINKS CONTAINING ALCOHOL DO YOU HAVE ON A TYPICAL DAY: 1 OR 2
AUDIT-C TOTAL SCORE: 4

## 2025-06-26 NOTE — PROGRESS NOTES
Subjective:     CC:   Chief Complaint   Patient presents with    Annual Exam     Pap        HPI:   Sendy Alcala is a 20 y.o.  female who presents for her annual exam. She is feeling well and denies any complaints.    OBGYN History  Last pap: never  Menarche: 11  LMP: 1 week ago. Periods are regular. Bleeding is light. Cramping is variable.  Patient is sexually active. Current contraceptive method: condoms. History of STIs: No  Denies any pelvic pain, dyspareunia, abnormal vaginal bleeding, or abnormal vaginal discharge.    Healthcare Maintenance  Last mammogram: n/a  Last colon cancer screening: n/a  Last bone density screening: n/a  Infectious disease screenings:  - STI Screening: ordered   - HIV Screening: not completed  - Hepatitis C Screening: negative 2023   Immunizations:  - Tdap: current  - Hep B: unknown - patient to look for records       She  has a past medical history of GERD (gastroesophageal reflux disease).  She  has no past surgical history on file.    Family History   Problem Relation Age of Onset    Psychiatric Illness Mother         Depression/Anxiety    Psychiatric Illness Maternal Grandmother     Diabetes Maternal Grandfather     Breast Cancer Paternal Grandmother        Social History     Socioeconomic History    Marital status: Single     Spouse name: Not on file    Number of children: Not on file    Years of education: Not on file    Highest education level: Some college, no degree   Occupational History    Not on file   Tobacco Use    Smoking status: Never    Smokeless tobacco: Never   Vaping Use    Vaping status: Never Used   Substance and Sexual Activity    Alcohol use: Not Currently     Comment: 1 drink  per month     Drug use: Never    Sexual activity: Yes     Partners: Male     Birth control/protection: Condom   Other Topics Concern    Behavioral problems Not Asked    Interpersonal relationships Not Asked    Sad or not enjoying activities Not Asked    Suicidal thoughts  Not Asked    Poor school performance Not Asked    Reading difficulties Not Asked    Speech difficulties Not Asked    Writing difficulties Not Asked    Inadequate sleep Not Asked    Excessive TV viewing Not Asked    Excessive video game use Not Asked    Inadequate exercise Not Asked    Sports related Not Asked    Poor diet Not Asked    Family concerns for drug/alcohol abuse Not Asked    Poor oral hygiene Not Asked    Bike safety Not Asked    Family concerns vehicle safety Not Asked   Social History Narrative    Not on file     Social Drivers of Health     Financial Resource Strain: Low Risk  (6/26/2025)    Overall Financial Resource Strain (CARDIA)     Difficulty of Paying Living Expenses: Not very hard   Food Insecurity: No Food Insecurity (6/26/2025)    Hunger Vital Sign     Worried About Running Out of Food in the Last Year: Never true     Ran Out of Food in the Last Year: Never true   Transportation Needs: No Transportation Needs (6/26/2025)    PRAPARE - Transportation     Lack of Transportation (Medical): No     Lack of Transportation (Non-Medical): No   Physical Activity: Sufficiently Active (6/26/2025)    Exercise Vital Sign     Days of Exercise per Week: 4 days     Minutes of Exercise per Session: 40 min   Stress: No Stress Concern Present (6/26/2025)    [place] [employer] - Occupational Stress Questionnaire     Feeling of Stress : Not at all   Social Connections: Moderately Integrated (6/26/2025)    Social Connection and Isolation Panel [NHANES]     Frequency of Communication with Friends and Family: More than three times a week     Frequency of Social Gatherings with Friends and Family: Once a week     Attends Orthodox Services: More than 4 times per year     Active Member of Clubs or Organizations: Yes     Attends Club or Organization Meetings: More than 4 times per year     Marital Status: Never    Intimate Partner Violence: Not on file   Housing Stability: Low Risk   "(6/26/2025)    Housing Stability Vital Sign     Unable to Pay for Housing in the Last Year: No     Number of Times Moved in the Last Year: 1     Homeless in the Last Year: No       Patient Active Problem List    Diagnosis Date Noted    Acute URI 01/05/2024    Foot pain, left 01/05/2024    Pleural effusion on left 01/05/2024    GERD (gastroesophageal reflux disease) 07/19/2023    Heart murmur 07/19/2023    PKU (phenylketonuria) ([facility]) 11/15/2021         Current Medications[1]  Allergies[2]      Review of Systems   Constitutional: Negative for fever, chills and malaise/fatigue.   HENT: Negative for congestion.    Eyes: Negative for blurred vision.  Respiratory: Negative for cough and shortness of breath.  Cardiovascular: Negative for leg swelling.   Gastrointestinal: Negative for nausea, vomiting, abdominal pain and diarrhea.   Genitourinary: Negative for dysuria and hematuria.   Skin: Negative for rash.   Neurological: Negative for dizziness, focal weakness and headaches.   Endo/Heme/Allergies: Does not bleed easily.   Psychiatric/Behavioral: Negative for depression.  The patient is not nervous/anxious.      Objective:     /70 (BP Location: Left arm, Patient Position: Sitting, BP Cuff Size: Adult)   Pulse 71   Temp 36.6 °C (97.8 °F) (Temporal)   Ht 1.651 m (5' 5\")   Wt 90.7 kg (200 lb)   SpO2 100%   BMI 33.28 kg/m²   Body mass index is 33.28 kg/m².  Wt Readings from Last 4 Encounters:   06/26/25 90.7 kg (200 lb)   05/01/25 96.3 kg (212 lb 3.2 oz)   09/24/24 94.8 kg (209 lb)   09/13/24 93.4 kg (206 lb)       Physical Exam:  Constitutional: Well-developed and well-nourished. Not diaphoretic. No distress.   Skin: Skin is warm and dry. No rash noted.  Head: Atraumatic without lesions.  Eyes: Conjunctivae and extraocular motions are normal. Pupils are equal, round, and reactive to light. No scleral icterus.   Ears:  External ears unremarkable. Tympanic membranes clear and intact.  Nose: Nares patent. No " discharge.   Mouth/Throat: Dentition is good. Oropharynx is clear and moist. Posterior pharynx without erythema or exudates.  Neck: Supple, trachea midline. Normal range of motion. No thyromegaly present. No lymphadenopathy.  Cardiovascular: Regular rate and rhythm, no murmur, rubs, or gallops.  Lungs: Normal inspiratory effort, CTA bilaterally, no wheezes/rhonchi/rales  Breast: Breasts examined seated and supine. No skin changes, peau d'orange or nipple retraction. No discharge. No axillary or supraclavicular adenopathy. No masses or nodularity palpable.   Abdomen: Soft, non tender, and without distention. No rebound, guarding, masses or HSM.  :Perineum and external genitalia normal without rash. Vagina with normal and physiologic discharge. Cervix without visible lesions or discharge.   Extremities: No cyanosis, clubbing, erythema, nor edema. Distal pulses intact and symmetric.   Musculoskeletal: All major joints AROM full in all directions without pain.  Neurological: Alert and oriented x 3. No cranial nerve deficit. Normal gait.  Psychiatric:  Behavior, mood, and affect are appropriate.      Assessment and Plan:     1. Encounter for well woman exam        2. Screening for cervical cancer  THINPREP PAP ONLY    CANCELED: THINPREP PAP ONLY      3. Screening examination for STI  Chlamydia/GC, PCR (Genital/Anal swab)      4. Hidradenitis suppurativa  clindamycin (CLEOCIN) 1 % Solution          - Labs per orders.  - Immunizations per orders.     Anticipatory guidance:  Discussed oral hygiene and dental home.  Discussed healthy nutrition.  Encouraged regular physical activity, including cardio and weights.  Encouraged 8 hours of sleep at night.  Discussed sun protection (clothing and sunscreen).  Encouraged use of seat belts, bike helmets.  Feels safe at home and in relationship.        Follow-up: Return in about 1 year (around 6/26/2026) for annual.           [1]   Current Outpatient Medications   Medication Sig  Dispense Refill    clindamycin (CLEOCIN) 1 % Solution Apply 1 Application topically 2 times a day. 30 mL 0    PALYNZIQ 20 MG/ML Solution Prefilled Syringe       fexofenadine (ALLEGRA ALLERGY) 60 MG Tab       Nutritional Supplements (PHENYLADE DRINK MIX) Powder Take 1 Packet by mouth 4 times a day. (Patient taking differently: Take 1 Packet by mouth 4 times a day. 5 Times Daily, per patient) 454 g 2    CALCIUM PO Take  by mouth.       No current facility-administered medications for this visit.   [2] No Known Allergies

## 2025-06-27 LAB
C TRACH DNA GENITAL QL NAA+PROBE: NEGATIVE
N GONORRHOEA DNA GENITAL QL NAA+PROBE: NEGATIVE
SPECIMEN SOURCE: NORMAL

## 2025-06-27 RX ORDER — CLINDAMYCIN PHOSPHATE 11.9 MG/ML
1 SOLUTION TOPICAL 2 TIMES DAILY
Qty: 30 ML | Refills: 0 | Status: SHIPPED | OUTPATIENT
Start: 2025-06-27

## 2025-07-01 ENCOUNTER — RESULTS FOLLOW-UP (OUTPATIENT)
Dept: MEDICAL GROUP | Facility: PHYSICIAN GROUP | Age: 20
End: 2025-07-01

## 2025-07-03 LAB — THINPREP PAP, CYTOLOGY NL11781: NORMAL
